# Patient Record
Sex: FEMALE | Race: WHITE | NOT HISPANIC OR LATINO | Employment: FULL TIME | URBAN - METROPOLITAN AREA
[De-identification: names, ages, dates, MRNs, and addresses within clinical notes are randomized per-mention and may not be internally consistent; named-entity substitution may affect disease eponyms.]

---

## 2023-04-08 DIAGNOSIS — I10 ESSENTIAL (PRIMARY) HYPERTENSION: ICD-10-CM

## 2023-04-10 RX ORDER — LABETALOL 200 MG/1
TABLET, FILM COATED ORAL
Qty: 180 TABLET | Refills: 3 | Status: SHIPPED | OUTPATIENT
Start: 2023-04-10 | End: 2024-04-17

## 2023-06-16 ENCOUNTER — APPOINTMENT (OUTPATIENT)
Dept: PRIMARY CARE | Facility: CLINIC | Age: 48
End: 2023-06-16
Payer: COMMERCIAL

## 2023-08-05 DIAGNOSIS — E78.5 HYPERLIPIDEMIA, UNSPECIFIED: ICD-10-CM

## 2023-08-07 RX ORDER — ROSUVASTATIN CALCIUM 10 MG/1
10 TABLET, COATED ORAL DAILY
Qty: 90 TABLET | Refills: 1 | Status: SHIPPED | OUTPATIENT
Start: 2023-08-07 | End: 2024-02-13

## 2023-09-25 ENCOUNTER — OFFICE VISIT (OUTPATIENT)
Dept: PRIMARY CARE | Facility: CLINIC | Age: 48
End: 2023-09-25
Payer: COMMERCIAL

## 2023-09-25 VITALS
OXYGEN SATURATION: 96 % | SYSTOLIC BLOOD PRESSURE: 134 MMHG | WEIGHT: 242.8 LBS | DIASTOLIC BLOOD PRESSURE: 88 MMHG | HEART RATE: 81 BPM | TEMPERATURE: 98.3 F | BODY MASS INDEX: 43.02 KG/M2 | HEIGHT: 63 IN

## 2023-09-25 DIAGNOSIS — Z00.00 HEALTHCARE MAINTENANCE: ICD-10-CM

## 2023-09-25 DIAGNOSIS — E66.01 CLASS 3 SEVERE OBESITY WITHOUT SERIOUS COMORBIDITY WITH BODY MASS INDEX (BMI) OF 40.0 TO 44.9 IN ADULT, UNSPECIFIED OBESITY TYPE (MULTI): ICD-10-CM

## 2023-09-25 DIAGNOSIS — H60.90 OTITIS EXTERNA, UNSPECIFIED CHRONICITY, UNSPECIFIED LATERALITY, UNSPECIFIED TYPE: ICD-10-CM

## 2023-09-25 DIAGNOSIS — E78.5 DYSLIPIDEMIA, GOAL LDL BELOW 70: ICD-10-CM

## 2023-09-25 DIAGNOSIS — I25.10 CORONARY ARTERY CALCIFICATION: ICD-10-CM

## 2023-09-25 DIAGNOSIS — I10 BENIGN ESSENTIAL HYPERTENSION: Primary | ICD-10-CM

## 2023-09-25 DIAGNOSIS — G43.909 MIGRAINE WITHOUT STATUS MIGRAINOSUS, NOT INTRACTABLE, UNSPECIFIED MIGRAINE TYPE: ICD-10-CM

## 2023-09-25 DIAGNOSIS — F17.209 NICOTINE-RELATED DISORDER: ICD-10-CM

## 2023-09-25 DIAGNOSIS — I25.84 CORONARY ARTERY CALCIFICATION: ICD-10-CM

## 2023-09-25 DIAGNOSIS — E04.2 MULTIPLE THYROID NODULES: ICD-10-CM

## 2023-09-25 DIAGNOSIS — Z00.00 ANNUAL PHYSICAL EXAM: ICD-10-CM

## 2023-09-25 PROBLEM — U07.1 COVID-19 VIRUS INFECTION: Status: RESOLVED | Noted: 2023-09-25 | Resolved: 2023-09-25

## 2023-09-25 PROBLEM — D31.41 NEVUS OF IRIS OF RIGHT EYE: Status: ACTIVE | Noted: 2023-09-25

## 2023-09-25 PROBLEM — R03.0 ELEVATED BLOOD PRESSURE READING: Status: RESOLVED | Noted: 2023-09-25 | Resolved: 2023-09-25

## 2023-09-25 PROBLEM — R06.02 SHORTNESS OF BREATH: Status: RESOLVED | Noted: 2023-09-25 | Resolved: 2023-09-25

## 2023-09-25 PROBLEM — R91.1 NODULE OF LOWER LOBE OF LEFT LUNG: Status: ACTIVE | Noted: 2023-09-25

## 2023-09-25 PROBLEM — R42 VERTIGO: Status: ACTIVE | Noted: 2023-09-25

## 2023-09-25 PROBLEM — U07.1 COVID-19 VIRUS INFECTION: Status: ACTIVE | Noted: 2023-09-25

## 2023-09-25 PROBLEM — H52.13 MYOPIA OF BOTH EYES: Status: ACTIVE | Noted: 2023-09-25

## 2023-09-25 PROBLEM — R03.0 ELEVATED BLOOD PRESSURE READING: Status: ACTIVE | Noted: 2023-09-25

## 2023-09-25 PROBLEM — H52.03 HYPERMETROPIA OF BOTH EYES: Status: ACTIVE | Noted: 2023-09-25

## 2023-09-25 PROBLEM — R06.02 SHORTNESS OF BREATH: Status: ACTIVE | Noted: 2023-09-25

## 2023-09-25 PROBLEM — S39.012A LUMBAR STRAIN, INITIAL ENCOUNTER: Status: ACTIVE | Noted: 2023-09-25

## 2023-09-25 PROBLEM — R32 URINARY INCONTINENCE: Status: ACTIVE | Noted: 2023-09-25

## 2023-09-25 PROBLEM — R19.7 DIARRHEA: Status: ACTIVE | Noted: 2023-09-25

## 2023-09-25 PROBLEM — R32 URINARY INCONTINENCE: Status: RESOLVED | Noted: 2023-09-25 | Resolved: 2023-09-25

## 2023-09-25 PROBLEM — R00.0 TACHYCARDIA WITH HYPERTENSION: Status: RESOLVED | Noted: 2023-09-25 | Resolved: 2023-09-25

## 2023-09-25 PROBLEM — N92.6 IRREGULAR MENSTRUAL CYCLE: Status: ACTIVE | Noted: 2023-09-25

## 2023-09-25 PROBLEM — R00.0 TACHYCARDIA WITH HYPERTENSION: Status: ACTIVE | Noted: 2023-09-25

## 2023-09-25 PROBLEM — R94.31 ABNORMAL EKG: Status: ACTIVE | Noted: 2023-09-25

## 2023-09-25 PROBLEM — E04.1 THYROID NODULE: Status: ACTIVE | Noted: 2023-09-25

## 2023-09-25 PROCEDURE — 3008F BODY MASS INDEX DOCD: CPT | Performed by: INTERNAL MEDICINE

## 2023-09-25 PROCEDURE — 90686 IIV4 VACC NO PRSV 0.5 ML IM: CPT | Performed by: INTERNAL MEDICINE

## 2023-09-25 PROCEDURE — 90471 IMMUNIZATION ADMIN: CPT | Performed by: INTERNAL MEDICINE

## 2023-09-25 PROCEDURE — 3079F DIAST BP 80-89 MM HG: CPT | Performed by: INTERNAL MEDICINE

## 2023-09-25 PROCEDURE — 3075F SYST BP GE 130 - 139MM HG: CPT | Performed by: INTERNAL MEDICINE

## 2023-09-25 PROCEDURE — 99214 OFFICE O/P EST MOD 30 MIN: CPT | Performed by: INTERNAL MEDICINE

## 2023-09-25 RX ORDER — SUMATRIPTAN SUCCINATE 100 MG/1
100 TABLET ORAL ONCE AS NEEDED
COMMUNITY
Start: 2018-12-04

## 2023-09-25 RX ORDER — PROCHLORPERAZINE MALEATE 10 MG
10 TABLET ORAL
COMMUNITY
Start: 2020-07-14

## 2023-09-25 RX ORDER — AMLODIPINE BESYLATE 10 MG/1
10 TABLET ORAL DAILY
COMMUNITY
End: 2023-11-20

## 2023-09-25 RX ORDER — CIPROFLOXACIN AND DEXAMETHASONE 3; 1 MG/ML; MG/ML
4 SUSPENSION/ DROPS AURICULAR (OTIC) 2 TIMES DAILY
Qty: 7.5 ML | Refills: 0 | Status: SHIPPED | OUTPATIENT
Start: 2023-09-25 | End: 2023-10-02

## 2023-09-25 RX ORDER — MECLIZINE HYDROCHLORIDE 25 MG/1
25 TABLET ORAL 3 TIMES DAILY PRN
COMMUNITY
Start: 2020-07-14

## 2023-09-25 ASSESSMENT — PATIENT HEALTH QUESTIONNAIRE - PHQ9
2. FEELING DOWN, DEPRESSED OR HOPELESS: NOT AT ALL
SUM OF ALL RESPONSES TO PHQ9 QUESTIONS 1 AND 2: 0
1. LITTLE INTEREST OR PLEASURE IN DOING THINGS: NOT AT ALL

## 2023-09-25 ASSESSMENT — ENCOUNTER SYMPTOMS
RESPIRATORY NEGATIVE: 1
CONSTITUTIONAL NEGATIVE: 1
NEUROLOGICAL NEGATIVE: 1
GASTROINTESTINAL NEGATIVE: 1
CARDIOVASCULAR NEGATIVE: 1
EYES NEGATIVE: 1
HEMATOLOGIC/LYMPHATIC NEGATIVE: 1
PSYCHIATRIC NEGATIVE: 1

## 2023-09-25 NOTE — ASSESSMENT & PLAN NOTE
I spent more than 3 minutes counseling patient about need for smoking/tobacco  cessation and how I can support efforts when patient is ready to quit,discussed Nicotine replacement therapy,bupropion,hypnosis,support groups,and acupuncture as potential options.  patient currently has no signs or symptoms of tobacco related disease.  Flu vaccine today.  I recommend Covid booster.  Return in February for wellness.  Go for labs prior to next appt.

## 2023-09-25 NOTE — PROGRESS NOTES
"Subjective   Patient ID: Oni Washburn is a 47 y.o. female who presents for 4 month follow up .     Here to f/u on HTN,BPV,thyroid nodule(had thyroid Bx Dr Nino escamilla)(had 3 attacks over last year,took Dramamine),LMP 12/2022,has always been irregular,no hot flashes.  She spoke to ENT about her vertigo.  She had mammogram.  She still has the cologuard kit,not sent yet.  she has stable migraine headache on Sumatriptan and Aleve PRN (was on Treximet in past),told to monitor BP while on Aleve.  she smokes 7 cigarettes per day.  she has h/o BPV,had 3 attacks over last year.            Review of Systems   Constitutional: Negative.    HENT: Negative.     Eyes: Negative.    Respiratory: Negative.     Cardiovascular: Negative.    Gastrointestinal: Negative.    Genitourinary: Negative.    Neurological: Negative.    Hematological: Negative.    Psychiatric/Behavioral: Negative.         Objective   /88 (BP Location: Left arm, Patient Position: Sitting)   Pulse 81   Temp 36.8 °C (98.3 °F) (Temporal)   Ht 1.6 m (5' 3\")   Wt 110 kg (242 lb 12.8 oz)   SpO2 96%   BMI 43.01 kg/m²     Physical Exam  Constitutional:       Appearance: Normal appearance.   HENT:      Head: Normocephalic and atraumatic.   Eyes:      Extraocular Movements: Extraocular movements intact.      Pupils: Pupils are equal, round, and reactive to light.   Cardiovascular:      Rate and Rhythm: Normal rate and regular rhythm.      Heart sounds: Normal heart sounds.   Pulmonary:      Effort: Pulmonary effort is normal.      Breath sounds: Normal breath sounds. No wheezing or rhonchi.   Abdominal:      General: Abdomen is flat. Bowel sounds are normal. There is no distension.      Palpations: Abdomen is soft.   Musculoskeletal:         General: Normal range of motion.      Cervical back: Normal range of motion and neck supple.      Right lower leg: No edema.      Left lower leg: No edema.   Skin:     General: Skin is warm.   Neurological:      General: No " focal deficit present.      Mental Status: She is alert and oriented to person, place, and time.   Psychiatric:         Mood and Affect: Mood normal.         Behavior: Behavior normal.         Assessment/Plan   Problem List Items Addressed This Visit             ICD-10-CM    Benign essential hypertension - Primary I10     Stable on current meds.         Coronary artery calcification I25.10, I25.84    Relevant Medications    amLODIPine (Norvasc) 10 mg tablet    Dyslipidemia, goal LDL below 70 E78.5     Stable on statin.         Migraines G43.909     Stable.         Multiple thyroid nodules E04.2    Nicotine-related disorder F17.209     I spent more than 3 minutes counseling patient about need for smoking/tobacco  cessation and how I can support efforts when patient is ready to quit,discussed Nicotine replacement therapy,bupropion,hypnosis,support groups,and acupuncture as potential options.  patient currently has no signs or symptoms of tobacco related disease.  Flu vaccine today.  I recommend Covid booster.  Return in February for wellness.  Go for labs prior to next appt.         Obesity E66.9     Pt lost 50 pounds and kept it off.          Other Visit Diagnoses         Codes    Healthcare maintenance     Z00.00    Relevant Orders    Flu vaccine (IIV4) age 6 months and greater, preservative free (Completed)    Otitis externa, unspecified chronicity, unspecified laterality, unspecified type     H60.90    Relevant Medications    ciprofloxacin-dexamethasone (CiproDEX) otic suspension    Annual physical exam     Z00.00    Relevant Orders    CBC and Auto Differential    Comprehensive Metabolic Panel    Hemoglobin A1C    Lipid Panel    TSH with reflex to Free T4 if abnormal

## 2024-02-13 DIAGNOSIS — E78.5 HYPERLIPIDEMIA, UNSPECIFIED: ICD-10-CM

## 2024-02-13 RX ORDER — ROSUVASTATIN CALCIUM 10 MG/1
10 TABLET, COATED ORAL DAILY
Qty: 90 TABLET | Refills: 1 | Status: SHIPPED | OUTPATIENT
Start: 2024-02-13

## 2024-02-19 ENCOUNTER — PATIENT MESSAGE (OUTPATIENT)
Dept: PRIMARY CARE | Facility: CLINIC | Age: 49
End: 2024-02-19
Payer: COMMERCIAL

## 2024-02-19 DIAGNOSIS — J22 ACUTE RESPIRATORY INFECTION: Primary | ICD-10-CM

## 2024-02-19 RX ORDER — DOXYCYCLINE 100 MG/1
100 CAPSULE ORAL 2 TIMES DAILY
Qty: 14 CAPSULE | Refills: 0 | Status: SHIPPED | OUTPATIENT
Start: 2024-02-19 | End: 2024-02-26

## 2024-02-23 ENCOUNTER — LAB (OUTPATIENT)
Dept: LAB | Facility: LAB | Age: 49
End: 2024-02-23
Payer: COMMERCIAL

## 2024-02-23 ENCOUNTER — OFFICE VISIT (OUTPATIENT)
Dept: PRIMARY CARE | Facility: CLINIC | Age: 49
End: 2024-02-23
Payer: COMMERCIAL

## 2024-02-23 VITALS
HEART RATE: 81 BPM | HEIGHT: 63 IN | TEMPERATURE: 97.5 F | SYSTOLIC BLOOD PRESSURE: 125 MMHG | WEIGHT: 246.4 LBS | OXYGEN SATURATION: 98 % | DIASTOLIC BLOOD PRESSURE: 88 MMHG | BODY MASS INDEX: 43.66 KG/M2

## 2024-02-23 DIAGNOSIS — R05.1 ACUTE COUGH: ICD-10-CM

## 2024-02-23 DIAGNOSIS — R91.1 NODULE OF LOWER LOBE OF LEFT LUNG: ICD-10-CM

## 2024-02-23 DIAGNOSIS — I25.10 CORONARY ARTERY CALCIFICATION: ICD-10-CM

## 2024-02-23 DIAGNOSIS — G43.909 MIGRAINE WITHOUT STATUS MIGRAINOSUS, NOT INTRACTABLE, UNSPECIFIED MIGRAINE TYPE: ICD-10-CM

## 2024-02-23 DIAGNOSIS — E04.1 THYROID NODULE: ICD-10-CM

## 2024-02-23 DIAGNOSIS — J20.8 ACUTE BRONCHITIS DUE TO OTHER SPECIFIED ORGANISMS: ICD-10-CM

## 2024-02-23 DIAGNOSIS — E78.5 DYSLIPIDEMIA, GOAL LDL BELOW 70: ICD-10-CM

## 2024-02-23 DIAGNOSIS — I25.84 CORONARY ARTERY CALCIFICATION: ICD-10-CM

## 2024-02-23 DIAGNOSIS — I10 BENIGN ESSENTIAL HYPERTENSION: ICD-10-CM

## 2024-02-23 DIAGNOSIS — Z00.00 ANNUAL PHYSICAL EXAM: ICD-10-CM

## 2024-02-23 DIAGNOSIS — E04.2 MULTIPLE THYROID NODULES: ICD-10-CM

## 2024-02-23 DIAGNOSIS — Z12.31 VISIT FOR SCREENING MAMMOGRAM: ICD-10-CM

## 2024-02-23 DIAGNOSIS — Z00.00 ANNUAL PHYSICAL EXAM: Primary | ICD-10-CM

## 2024-02-23 PROBLEM — N92.6 IRREGULAR MENSTRUAL CYCLE: Status: RESOLVED | Noted: 2023-09-25 | Resolved: 2024-02-23

## 2024-02-23 PROBLEM — R19.7 DIARRHEA: Status: RESOLVED | Noted: 2023-09-25 | Resolved: 2024-02-23

## 2024-02-23 PROBLEM — R11.2 NAUSEA AND VOMITING: Status: ACTIVE | Noted: 2024-02-23

## 2024-02-23 PROBLEM — H60.90 OTITIS EXTERNA: Status: ACTIVE | Noted: 2024-02-23

## 2024-02-23 LAB
ALBUMIN SERPL BCP-MCNC: 4.1 G/DL (ref 3.4–5)
ALP SERPL-CCNC: 47 U/L (ref 33–110)
ALT SERPL W P-5'-P-CCNC: 25 U/L (ref 7–45)
ANION GAP SERPL CALC-SCNC: 12 MMOL/L (ref 10–20)
AST SERPL W P-5'-P-CCNC: 19 U/L (ref 9–39)
BILIRUB SERPL-MCNC: 0.5 MG/DL (ref 0–1.2)
BUN SERPL-MCNC: 13 MG/DL (ref 6–23)
CALCIUM SERPL-MCNC: 9 MG/DL (ref 8.6–10.3)
CHLORIDE SERPL-SCNC: 107 MMOL/L (ref 98–107)
CHOLEST SERPL-MCNC: 114 MG/DL (ref 0–199)
CHOLESTEROL/HDL RATIO: 4.8
CO2 SERPL-SCNC: 24 MMOL/L (ref 21–32)
CREAT SERPL-MCNC: 0.86 MG/DL (ref 0.5–1.05)
EGFRCR SERPLBLD CKD-EPI 2021: 84 ML/MIN/1.73M*2
ERYTHROCYTE [DISTWIDTH] IN BLOOD BY AUTOMATED COUNT: 12.8 % (ref 11.5–14.5)
EST. AVERAGE GLUCOSE BLD GHB EST-MCNC: 114 MG/DL
GLUCOSE SERPL-MCNC: 95 MG/DL (ref 74–99)
HBA1C MFR BLD: 5.6 %
HCT VFR BLD AUTO: 44.5 % (ref 36–46)
HDLC SERPL-MCNC: 24 MG/DL
HGB BLD-MCNC: 15.2 G/DL (ref 12–16)
LDLC SERPL CALC-MCNC: 70 MG/DL
MCH RBC QN AUTO: 30.7 PG (ref 26–34)
MCHC RBC AUTO-ENTMCNC: 34.2 G/DL (ref 32–36)
MCV RBC AUTO: 90 FL (ref 80–100)
NON HDL CHOLESTEROL: 90 MG/DL (ref 0–149)
NRBC BLD-RTO: 0 /100 WBCS (ref 0–0)
PLATELET # BLD AUTO: 266 X10*3/UL (ref 150–450)
POTASSIUM SERPL-SCNC: 4.2 MMOL/L (ref 3.5–5.3)
PROT SERPL-MCNC: 6.8 G/DL (ref 6.4–8.2)
RBC # BLD AUTO: 4.95 X10*6/UL (ref 4–5.2)
SODIUM SERPL-SCNC: 139 MMOL/L (ref 136–145)
TRIGL SERPL-MCNC: 101 MG/DL (ref 0–149)
TSH SERPL-ACNC: 1.08 MIU/L (ref 0.44–3.98)
VLDL: 20 MG/DL (ref 0–40)
WBC # BLD AUTO: 5.6 X10*3/UL (ref 4.4–11.3)

## 2024-02-23 PROCEDURE — 80053 COMPREHEN METABOLIC PANEL: CPT

## 2024-02-23 PROCEDURE — 99396 PREV VISIT EST AGE 40-64: CPT | Performed by: INTERNAL MEDICINE

## 2024-02-23 PROCEDURE — 85027 COMPLETE CBC AUTOMATED: CPT

## 2024-02-23 PROCEDURE — 83036 HEMOGLOBIN GLYCOSYLATED A1C: CPT

## 2024-02-23 PROCEDURE — 3074F SYST BP LT 130 MM HG: CPT | Performed by: INTERNAL MEDICINE

## 2024-02-23 PROCEDURE — 84443 ASSAY THYROID STIM HORMONE: CPT

## 2024-02-23 PROCEDURE — 36415 COLL VENOUS BLD VENIPUNCTURE: CPT

## 2024-02-23 PROCEDURE — 3079F DIAST BP 80-89 MM HG: CPT | Performed by: INTERNAL MEDICINE

## 2024-02-23 PROCEDURE — 80061 LIPID PANEL: CPT

## 2024-02-23 PROCEDURE — 3008F BODY MASS INDEX DOCD: CPT | Performed by: INTERNAL MEDICINE

## 2024-02-23 RX ORDER — ALBUTEROL SULFATE 90 UG/1
AEROSOL, METERED RESPIRATORY (INHALATION)
Qty: 8.5 G | Refills: 0 | Status: SHIPPED | OUTPATIENT
Start: 2024-02-23

## 2024-02-23 ASSESSMENT — ENCOUNTER SYMPTOMS
EYES NEGATIVE: 1
CONSTITUTIONAL NEGATIVE: 1
NEUROLOGICAL NEGATIVE: 1
HEMATOLOGIC/LYMPHATIC NEGATIVE: 1
CARDIOVASCULAR NEGATIVE: 1
COUGH: 1
GASTROINTESTINAL NEGATIVE: 1
PSYCHIATRIC NEGATIVE: 1

## 2024-02-23 ASSESSMENT — PATIENT HEALTH QUESTIONNAIRE - PHQ9
1. LITTLE INTEREST OR PLEASURE IN DOING THINGS: NOT AT ALL
2. FEELING DOWN, DEPRESSED OR HOPELESS: NOT AT ALL
SUM OF ALL RESPONSES TO PHQ9 QUESTIONS 1 AND 2: 0

## 2024-02-23 NOTE — ASSESSMENT & PLAN NOTE
Complete physical examination completed today.  Advised to keep a heart healthy, low-fat diet as recommended diet is the Mediterranean diet.  Advised to exercise regularly for 30 minutes daily 5 days a week and maintain 150 minutes of exercise per week.  Discussed age-appropriate cancer screening,Immunization and recommendation were given.  Advised on regular eye and dental visit.  Advised on staying well-hydrated.

## 2024-02-23 NOTE — PROGRESS NOTES
"Subjective   Patient ID: Oni Washburn is a 48 y.o. female who presents for Annual Exam.     Here for CPE and to f/u on HTN,BPV,thyroid nodule(had thyroid Bx Dr Oneill ),LMP 12/2022,hot flashes  resolved .  She is here also to follow-up on her respiratory infection, feeling better with doxycycline still has some residual cough, her COVID test was negative but was checked on day 1 ask her to recheck it again today.  She had mammogram April 2023.  Last cologuard was negative on 11/22/2023  she has stable migraine headache on Sumatriptan and Aleve PRN (was on Treximet in past),told to monitor BP while on Aleve.  she smokes 7 cigarettes or less per day , not smoking due to current respiratory infection.  she has h/o BPV,had few attacks over last year.            Review of Systems   Constitutional: Negative.    HENT: Negative.     Eyes: Negative.    Respiratory:  Positive for cough.         As HPI has a respiratory infection for 5 days.   Cardiovascular: Negative.    Gastrointestinal: Negative.    Genitourinary: Negative.    Neurological: Negative.    Hematological: Negative.    Psychiatric/Behavioral: Negative.         Objective   /88 (BP Location: Left arm, Patient Position: Sitting, BP Cuff Size: Large adult)   Pulse 81   Temp 36.4 °C (97.5 °F) (Temporal)   Ht 1.6 m (5' 3\")   Wt 112 kg (246 lb 6.4 oz)   SpO2 98%   BMI 43.65 kg/m²     Physical Exam  Vitals reviewed.   Constitutional:       General: She is not in acute distress.     Appearance: Normal appearance.   HENT:      Head: Normocephalic and atraumatic.      Right Ear: Tympanic membrane and ear canal normal. There is no impacted cerumen.      Left Ear: Tympanic membrane and ear canal normal. There is no impacted cerumen.      Nose: Nose normal.      Mouth/Throat:      Mouth: Mucous membranes are moist.      Pharynx: No oropharyngeal exudate or posterior oropharyngeal erythema.   Eyes:      General: No scleral icterus.     Extraocular Movements: " Extraocular movements intact.      Pupils: Pupils are equal, round, and reactive to light.   Neck:      Vascular: No carotid bruit.   Cardiovascular:      Rate and Rhythm: Normal rate and regular rhythm.      Pulses: Normal pulses.      Heart sounds: Normal heart sounds. No murmur heard.  Pulmonary:      Effort: Pulmonary effort is normal.      Comments: Coarse breath sounds clearing up with coughing.  Chest:   Breasts:     Right: Normal. No mass, skin change or tenderness.      Left: Normal. No mass, skin change or tenderness.   Abdominal:      General: Abdomen is flat. Bowel sounds are normal.      Palpations: Abdomen is soft.   Musculoskeletal:         General: Normal range of motion.      Cervical back: Normal range of motion and neck supple.   Lymphadenopathy:      Cervical: No cervical adenopathy.      Upper Body:      Right upper body: No supraclavicular or axillary adenopathy.      Left upper body: No supraclavicular or axillary adenopathy.   Neurological:      General: No focal deficit present.      Mental Status: She is alert and oriented to person, place, and time.   Psychiatric:         Mood and Affect: Mood normal.         Assessment/Plan   Problem List Items Addressed This Visit             ICD-10-CM    Benign essential hypertension I10     Stable on current medication.         Coronary artery calcification I25.10, I25.84     Denies any chest pain, seen by cardiologist for evaluation past.         Dyslipidemia, goal LDL below 70 E78.5     Stable on statin and low-fat diet.  Stop Smoking.         Migraines G43.909     Stable on current medication.         Multiple thyroid nodules E04.2     Follow-up with ENT.         Nodule of lower lobe of left lung R91.1     Will defer CT due to her current acute bronchitis, will plan on seeing patient in 4 months and at that time I will order her CT chest.  Advised to stop smoking.         Thyroid nodule E04.1    Annual physical exam - Primary Z00.00     Complete  physical examination completed today.  Advised to keep a heart healthy, low-fat diet as recommended diet is the Mediterranean diet.  Advised to exercise regularly for 30 minutes daily 5 days a week and maintain 150 minutes of exercise per week.  Discussed age-appropriate cancer screening,Immunization and recommendation were given.  Advised on regular eye and dental visit.  Advised on staying well-hydrated.         Relevant Orders    CBC    Comprehensive Metabolic Panel    Lipid Panel    TSH with reflex to Free T4 if abnormal    Hemoglobin A1C    Acute bronchitis due to other specified organisms J20.8     May finish off current doxycycline.  Stay off smoking.  Can add Mucinex DM.  Albuterol inhaler to be used as needed.  Repeat another COVID test today call if positive.  Will be due for Cologuard November 2026.  Order mammogram for April 2024.         Relevant Medications    albuterol (ProAir HFA) 90 mcg/actuation inhaler    Acute cough R05.1    Relevant Medications    albuterol (ProAir HFA) 90 mcg/actuation inhaler     Other Visit Diagnoses         Codes    Visit for screening mammogram     Z12.31    Relevant Orders    BI mammo bilateral screening tomosynthesis

## 2024-02-23 NOTE — ASSESSMENT & PLAN NOTE
Will defer CT due to her current acute bronchitis, will plan on seeing patient in 4 months and at that time I will order her CT chest.  Advised to stop smoking.

## 2024-02-23 NOTE — ASSESSMENT & PLAN NOTE
May finish off current doxycycline.  Stay off smoking.  Can add Mucinex DM.  Albuterol inhaler to be used as needed.  Repeat another COVID test today call if positive.  Will be due for Cologuard November 2026.  Order mammogram for April 2024.

## 2024-04-17 DIAGNOSIS — I10 ESSENTIAL (PRIMARY) HYPERTENSION: ICD-10-CM

## 2024-04-17 RX ORDER — LABETALOL 200 MG/1
1 TABLET, FILM COATED ORAL 2 TIMES DAILY
Qty: 180 TABLET | Refills: 0 | Status: SHIPPED | OUTPATIENT
Start: 2024-04-17

## 2024-05-03 ENCOUNTER — HOSPITAL ENCOUNTER (OUTPATIENT)
Dept: RADIOLOGY | Facility: CLINIC | Age: 49
Discharge: HOME | End: 2024-05-03
Payer: COMMERCIAL

## 2024-05-03 VITALS — BODY MASS INDEX: 43.75 KG/M2 | HEIGHT: 63 IN | WEIGHT: 246.91 LBS

## 2024-05-03 DIAGNOSIS — Z12.31 VISIT FOR SCREENING MAMMOGRAM: ICD-10-CM

## 2024-05-03 PROCEDURE — 77067 SCR MAMMO BI INCL CAD: CPT

## 2024-05-03 PROCEDURE — 77067 SCR MAMMO BI INCL CAD: CPT | Performed by: STUDENT IN AN ORGANIZED HEALTH CARE EDUCATION/TRAINING PROGRAM

## 2024-05-03 PROCEDURE — 77063 BREAST TOMOSYNTHESIS BI: CPT | Performed by: STUDENT IN AN ORGANIZED HEALTH CARE EDUCATION/TRAINING PROGRAM

## 2024-06-14 ENCOUNTER — APPOINTMENT (OUTPATIENT)
Dept: PRIMARY CARE | Facility: CLINIC | Age: 49
End: 2024-06-14
Payer: COMMERCIAL

## 2024-07-24 DIAGNOSIS — I10 ESSENTIAL (PRIMARY) HYPERTENSION: ICD-10-CM

## 2024-07-24 RX ORDER — LABETALOL 200 MG/1
1 TABLET, FILM COATED ORAL 2 TIMES DAILY
Qty: 180 TABLET | Refills: 0 | Status: SHIPPED | OUTPATIENT
Start: 2024-07-24

## 2024-08-13 DIAGNOSIS — E78.5 HYPERLIPIDEMIA, UNSPECIFIED: ICD-10-CM

## 2024-08-13 RX ORDER — ROSUVASTATIN CALCIUM 10 MG/1
10 TABLET, COATED ORAL DAILY
Qty: 90 TABLET | Refills: 1 | Status: SHIPPED | OUTPATIENT
Start: 2024-08-13

## 2024-09-04 ENCOUNTER — APPOINTMENT (OUTPATIENT)
Dept: PRIMARY CARE | Facility: CLINIC | Age: 49
End: 2024-09-04
Payer: COMMERCIAL

## 2024-09-04 VITALS
WEIGHT: 237 LBS | HEART RATE: 74 BPM | TEMPERATURE: 97.9 F | BODY MASS INDEX: 41.99 KG/M2 | SYSTOLIC BLOOD PRESSURE: 138 MMHG | DIASTOLIC BLOOD PRESSURE: 84 MMHG | OXYGEN SATURATION: 97 %

## 2024-09-04 DIAGNOSIS — I25.84 CORONARY ARTERY CALCIFICATION: ICD-10-CM

## 2024-09-04 DIAGNOSIS — F17.209 NICOTINE-RELATED DISORDER: ICD-10-CM

## 2024-09-04 DIAGNOSIS — R11.2 NAUSEA AND VOMITING, UNSPECIFIED VOMITING TYPE: ICD-10-CM

## 2024-09-04 DIAGNOSIS — I25.10 CORONARY ARTERY CALCIFICATION: ICD-10-CM

## 2024-09-04 DIAGNOSIS — E04.2 MULTIPLE THYROID NODULES: ICD-10-CM

## 2024-09-04 DIAGNOSIS — R42 VERTIGO: ICD-10-CM

## 2024-09-04 DIAGNOSIS — E66.01 CLASS 3 SEVERE OBESITY WITHOUT SERIOUS COMORBIDITY WITH BODY MASS INDEX (BMI) OF 40.0 TO 44.9 IN ADULT, UNSPECIFIED OBESITY TYPE (MULTI): ICD-10-CM

## 2024-09-04 DIAGNOSIS — Z23 NEED FOR INFLUENZA VACCINATION: ICD-10-CM

## 2024-09-04 DIAGNOSIS — I10 BENIGN ESSENTIAL HYPERTENSION: Primary | ICD-10-CM

## 2024-09-04 DIAGNOSIS — N95.0 POST-MENOPAUSAL BLEEDING: ICD-10-CM

## 2024-09-04 PROCEDURE — 90471 IMMUNIZATION ADMIN: CPT | Performed by: INTERNAL MEDICINE

## 2024-09-04 PROCEDURE — 90656 IIV3 VACC NO PRSV 0.5 ML IM: CPT | Performed by: INTERNAL MEDICINE

## 2024-09-04 PROCEDURE — 3079F DIAST BP 80-89 MM HG: CPT | Performed by: INTERNAL MEDICINE

## 2024-09-04 PROCEDURE — 99214 OFFICE O/P EST MOD 30 MIN: CPT | Performed by: INTERNAL MEDICINE

## 2024-09-04 PROCEDURE — 3075F SYST BP GE 130 - 139MM HG: CPT | Performed by: INTERNAL MEDICINE

## 2024-09-04 RX ORDER — PROCHLORPERAZINE MALEATE 10 MG
TABLET ORAL
Qty: 30 TABLET | Refills: 3 | Status: SHIPPED | OUTPATIENT
Start: 2024-09-04

## 2024-09-04 RX ORDER — MECLIZINE HYDROCHLORIDE 25 MG/1
25 TABLET ORAL 3 TIMES DAILY PRN
Qty: 30 TABLET | Refills: 3 | Status: SHIPPED | OUTPATIENT
Start: 2024-09-04

## 2024-09-05 PROBLEM — Z23 NEED FOR INFLUENZA VACCINATION: Status: ACTIVE | Noted: 2024-09-05

## 2024-09-05 ASSESSMENT — ENCOUNTER SYMPTOMS
HEMATOLOGIC/LYMPHATIC NEGATIVE: 1
CARDIOVASCULAR NEGATIVE: 1
PSYCHIATRIC NEGATIVE: 1
CONSTITUTIONAL NEGATIVE: 1
EYES NEGATIVE: 1
NEUROLOGICAL NEGATIVE: 1
RESPIRATORY NEGATIVE: 1
GASTROINTESTINAL NEGATIVE: 1

## 2024-09-05 NOTE — PROGRESS NOTES
"Subjective   Patient ID: Oni Washburn is a 48 y.o. female who presents for Follow-up (Patient is here for a 4 month follow up. Patient says that she started her menstrual cycle started for the first time in 2 years after menopause was confirmed through blood work. ).    Pt is here to f/u on HTN,BPV,thyroid nodule(had thyroid Bx Dr Oneill ),LMP 12/2022,hot flashes  resolved .  She has not had a period for over 1-1/2-year but 2 weeks ago she had\" a full-blown period\", denies abdominal pain or cramping.  She had mammogram 5/3/2024  Last cologuard was negative on 11/22/2023  she has stable migraine headache on Sumatriptan and Aleve PRN (was on Treximet in past),told to monitor BP while on Aleve.  she smokes 7 cigarettes or less per day, she has been under stress  she has h/o BPV, lately feeling nauseated, needs refill on her medication has not had dizziness for a long time.  Denies any acid reflux or abdominal pain.         Review of Systems   Constitutional: Negative.    HENT: Negative.     Eyes: Negative.    Respiratory: Negative.     Cardiovascular: Negative.    Gastrointestinal: Negative.    Genitourinary: Negative.    Neurological: Negative.    Hematological: Negative.    Psychiatric/Behavioral: Negative.         Objective   /84 (BP Location: Left arm, Patient Position: Sitting)   Pulse 74   Temp 36.6 °C (97.9 °F) (Temporal)   Wt 108 kg (237 lb)   SpO2 97%   BMI 41.99 kg/m²     Physical Exam  Constitutional:       Appearance: Normal appearance.   HENT:      Head: Normocephalic and atraumatic.   Eyes:      Extraocular Movements: Extraocular movements intact.      Pupils: Pupils are equal, round, and reactive to light.   Cardiovascular:      Rate and Rhythm: Normal rate and regular rhythm.      Heart sounds: Normal heart sounds.   Pulmonary:      Effort: Pulmonary effort is normal.      Breath sounds: Normal breath sounds. No wheezing or rhonchi.   Abdominal:      General: Abdomen is flat. Bowel sounds are " normal. There is no distension.      Palpations: Abdomen is soft.      Tenderness: There is no right CVA tenderness or left CVA tenderness.   Musculoskeletal:         General: Normal range of motion.      Cervical back: Normal range of motion and neck supple.      Right lower leg: No edema.      Left lower leg: No edema.   Skin:     General: Skin is warm.   Neurological:      General: No focal deficit present.      Mental Status: She is alert and oriented to person, place, and time.   Psychiatric:         Mood and Affect: Mood normal.         Behavior: Behavior normal.         Assessment/Plan   Problem List Items Addressed This Visit             ICD-10-CM    Benign essential hypertension - Primary I10     Stable on current medication.         Coronary artery calcification I25.10, I25.84     Denies any chest pain, seen by cardiologist for evaluation past.         Multiple thyroid nodules E04.2     Follow-up with ENT.  Had biopsy in past.         Vertigo R42     Renew medication.         Relevant Medications    meclizine (Antivert) 25 mg tablet    Nicotine-related disorder F17.209    Obesity E66.9     Watch calorie intake exercise regularly lose weight.         Nausea and vomiting R11.2    Relevant Medications    prochlorperazine (Compazine) 10 mg tablet    Post-menopausal bleeding N95.0     Will order pelvic ultrasound and refer to gynecologist for further evaluation.         Relevant Orders    US PELVIS TRANSABDOMINAL WITH TRANSVAGINAL    Referral to Gynecology    Need for influenza vaccination Z23    Relevant Orders    Flu vaccine, trivalent, preservative free, age 6 months and greater (Fluraix/Fluzone/Flulaval) (Completed)

## 2024-10-07 ENCOUNTER — HOSPITAL ENCOUNTER (OUTPATIENT)
Dept: RADIOLOGY | Facility: CLINIC | Age: 49
Discharge: HOME | End: 2024-10-07
Payer: COMMERCIAL

## 2024-10-07 DIAGNOSIS — N95.0 POST-MENOPAUSAL BLEEDING: ICD-10-CM

## 2024-10-07 PROCEDURE — 76830 TRANSVAGINAL US NON-OB: CPT | Performed by: RADIOLOGY

## 2024-10-07 PROCEDURE — 76856 US EXAM PELVIC COMPLETE: CPT

## 2024-10-07 PROCEDURE — 76856 US EXAM PELVIC COMPLETE: CPT | Performed by: RADIOLOGY

## 2024-10-22 DIAGNOSIS — I10 ESSENTIAL (PRIMARY) HYPERTENSION: ICD-10-CM

## 2024-10-22 RX ORDER — LABETALOL 200 MG/1
1 TABLET, FILM COATED ORAL 2 TIMES DAILY
Qty: 180 TABLET | Refills: 1 | Status: SHIPPED | OUTPATIENT
Start: 2024-10-22

## 2024-11-12 DIAGNOSIS — I10 ESSENTIAL (PRIMARY) HYPERTENSION: ICD-10-CM

## 2024-11-12 RX ORDER — AMLODIPINE BESYLATE 10 MG/1
10 TABLET ORAL DAILY
Qty: 90 TABLET | Refills: 3 | Status: SHIPPED | OUTPATIENT
Start: 2024-11-12

## 2024-12-20 ENCOUNTER — TELEMEDICINE (OUTPATIENT)
Dept: PRIMARY CARE | Facility: CLINIC | Age: 49
End: 2024-12-20
Payer: COMMERCIAL

## 2024-12-20 DIAGNOSIS — J98.8 RESPIRATORY TRACT INFECTION DUE TO COVID-19 VIRUS: Primary | ICD-10-CM

## 2024-12-20 DIAGNOSIS — U07.1 RESPIRATORY TRACT INFECTION DUE TO COVID-19 VIRUS: Primary | ICD-10-CM

## 2024-12-20 DIAGNOSIS — R09.81 NASAL CONGESTION: ICD-10-CM

## 2024-12-20 PROCEDURE — 99213 OFFICE O/P EST LOW 20 MIN: CPT | Performed by: INTERNAL MEDICINE

## 2024-12-20 RX ORDER — FLUTICASONE PROPIONATE 50 MCG
1 SPRAY, SUSPENSION (ML) NASAL DAILY
Qty: 16 G | Refills: 0 | Status: SHIPPED | OUTPATIENT
Start: 2024-12-20 | End: 2025-12-20

## 2024-12-20 ASSESSMENT — PATIENT HEALTH QUESTIONNAIRE - PHQ9
1. LITTLE INTEREST OR PLEASURE IN DOING THINGS: NOT AT ALL
SUM OF ALL RESPONSES TO PHQ9 QUESTIONS 1 AND 2: 0
2. FEELING DOWN, DEPRESSED OR HOPELESS: NOT AT ALL

## 2024-12-20 ASSESSMENT — ENCOUNTER SYMPTOMS
ABDOMINAL PAIN: 0
NECK PAIN: 1
SWOLLEN GLANDS: 0
NAUSEA: 0
WHEEZING: 0
VOMITING: 0
COUGH: 1
SORE THROAT: 0
SINUS PAIN: 0
HEADACHES: 0
RHINORRHEA: 1
DIARRHEA: 0

## 2024-12-20 NOTE — PROGRESS NOTES
Subjective   Patient ID: Oni Washburn is a 49 y.o. female who presents for URI (Patient is here for a virtual visit. Patient tested positive for COVID this morning. Patient complains of fatigue and body aches. ).    An interactive audio and video telecommunication system with patient real time communications between the patient (at the original site) and provider (at the distant site) was utilized to provide this telehealth service.  Verbal consent was requested and obtained from the patient at this date for a telehealth.  Patient started feeling fatigue sluggish last night today she woke up with cough and muscle pain she left for work and went to the pharmacy to get the COVID get and tested just  positive for COVID.  She did not have the recent COVID booster vaccine, she had the flu vaccine for this season.  No loss of smell or taste.    URI   This is a new problem. The current episode started yesterday. The problem has been gradually worsening. There has been no fever. Associated symptoms include congestion, coughing, neck pain and rhinorrhea. Pertinent negatives include no abdominal pain, chest pain, diarrhea, ear pain, headaches, joint pain, joint swelling, nausea, plugged ear sensation, rash, sinus pain, sneezing, sore throat, swollen glands, vomiting or wheezing. Associated symptoms comments: Myalgia,fatigue,sluggish.. Treatments tried: just got Robitussin.        Review of Systems   HENT:  Positive for congestion and rhinorrhea. Negative for ear pain, sinus pain, sneezing and sore throat.    Respiratory:  Positive for cough. Negative for wheezing.    Cardiovascular:  Negative for chest pain.   Gastrointestinal:  Negative for abdominal pain, diarrhea, nausea and vomiting.   Musculoskeletal:  Positive for neck pain. Negative for joint pain.   Skin:  Negative for rash.   Neurological:  Negative for headaches.       Objective   LMP 08/27/2024 (Exact Date)     Physical Exam  Constitutional:       General: She is  not in acute distress.  Pulmonary:      Effort: No respiratory distress.   Neurological:      Mental Status: She is alert.         Assessment/Plan   Problem List Items Addressed This Visit             ICD-10-CM    Respiratory tract infection due to COVID-19 virus - Primary U07.1, J98.8     Patient at high risk for COVID due to her medical problem hypertension , obesity smoking , will treat with Paxlovid , advised to hold off rosuvastatin while taking Paxlovid .  rest,push fluid,monitor pulse ox closely,go to ER if below 90 % or if short of breath.  Follow CDC recommendation regarding guidelines isolation and recommendation.  Call if not better.  Add Flonase nasal spray for nasal congestion as needed.  Can Tylenol for pain and fever and Mucinex DM for cough as needed.         Relevant Medications    nirmatrelvir-ritonavir (Paxlovid) 300 mg (150 mg x 2)-100 mg tablet therapy pack     Other Visit Diagnoses         Codes    Nasal congestion     R09.81    Relevant Medications    fluticasone (Flonase) 50 mcg/actuation nasal spray

## 2024-12-20 NOTE — ASSESSMENT & PLAN NOTE
Patient at high risk for COVID due to her medical problem hypertension , obesity smoking , will treat with Paxlovid , advised to hold off rosuvastatin while taking Paxlovid .  rest,push fluid,monitor pulse ox closely,go to ER if below 90 % or if short of breath.  Follow CDC recommendation regarding guidelines isolation and recommendation.  Call if not better.  Add Flonase nasal spray for nasal congestion as needed.  Can Tylenol for pain and fever and Mucinex DM for cough as needed.

## 2025-01-11 DIAGNOSIS — R09.81 NASAL CONGESTION: ICD-10-CM

## 2025-01-13 RX ORDER — FLUTICASONE PROPIONATE 50 MCG
SPRAY, SUSPENSION (ML) NASAL
Qty: 48 ML | Refills: 3 | Status: SHIPPED | OUTPATIENT
Start: 2025-01-13

## 2025-02-11 DIAGNOSIS — E78.5 HYPERLIPIDEMIA, UNSPECIFIED: ICD-10-CM

## 2025-02-11 RX ORDER — ROSUVASTATIN CALCIUM 10 MG/1
10 TABLET, COATED ORAL DAILY
Qty: 90 TABLET | Refills: 0 | Status: SHIPPED | OUTPATIENT
Start: 2025-02-11

## 2025-02-24 ENCOUNTER — APPOINTMENT (OUTPATIENT)
Dept: PRIMARY CARE | Facility: CLINIC | Age: 50
End: 2025-02-24
Payer: COMMERCIAL

## 2025-05-16 DIAGNOSIS — E78.5 HYPERLIPIDEMIA, UNSPECIFIED: ICD-10-CM

## 2025-05-16 RX ORDER — ROSUVASTATIN CALCIUM 10 MG/1
10 TABLET, COATED ORAL DAILY
Qty: 90 TABLET | Refills: 0 | Status: SHIPPED | OUTPATIENT
Start: 2025-05-16

## 2025-05-16 NOTE — TELEPHONE ENCOUNTER
Thierry for pt to schedule a physical, advised it would be the end of June or early to mid July. Advise Tasia when appt scheduled so medication can be sent in

## 2025-06-29 ENCOUNTER — PATIENT MESSAGE (OUTPATIENT)
Dept: PRIMARY CARE | Facility: CLINIC | Age: 50
End: 2025-06-29
Payer: COMMERCIAL

## 2025-06-29 DIAGNOSIS — M25.539 PAIN IN WRIST, UNSPECIFIED LATERALITY: Primary | ICD-10-CM

## 2025-06-29 DIAGNOSIS — R20.2 HAND PARESTHESIA, UNSPECIFIED LATERALITY: ICD-10-CM

## 2025-07-07 ENCOUNTER — OFFICE VISIT (OUTPATIENT)
Dept: PRIMARY CARE | Facility: CLINIC | Age: 50
End: 2025-07-07
Payer: COMMERCIAL

## 2025-07-07 VITALS
BODY MASS INDEX: 44.37 KG/M2 | HEIGHT: 63 IN | TEMPERATURE: 98 F | WEIGHT: 250.4 LBS | RESPIRATION RATE: 16 BRPM | HEART RATE: 95 BPM | OXYGEN SATURATION: 98 % | DIASTOLIC BLOOD PRESSURE: 80 MMHG | SYSTOLIC BLOOD PRESSURE: 140 MMHG

## 2025-07-07 DIAGNOSIS — R05.1 ACUTE COUGH: ICD-10-CM

## 2025-07-07 DIAGNOSIS — R09.81 NASAL CONGESTION: ICD-10-CM

## 2025-07-07 PROCEDURE — 3079F DIAST BP 80-89 MM HG: CPT | Performed by: NURSE PRACTITIONER

## 2025-07-07 PROCEDURE — 3077F SYST BP >= 140 MM HG: CPT | Performed by: NURSE PRACTITIONER

## 2025-07-07 PROCEDURE — 3008F BODY MASS INDEX DOCD: CPT | Performed by: NURSE PRACTITIONER

## 2025-07-07 PROCEDURE — 99213 OFFICE O/P EST LOW 20 MIN: CPT | Performed by: NURSE PRACTITIONER

## 2025-07-07 RX ORDER — FLUTICASONE PROPIONATE 50 MCG
1 SPRAY, SUSPENSION (ML) NASAL DAILY
Qty: 48 ML | Refills: 0 | Status: SHIPPED | OUTPATIENT
Start: 2025-07-07

## 2025-07-07 RX ORDER — BENZONATATE 200 MG/1
200 CAPSULE ORAL 3 TIMES DAILY PRN
Qty: 42 CAPSULE | Refills: 0 | Status: SHIPPED | OUTPATIENT
Start: 2025-07-07 | End: 2025-07-21

## 2025-07-07 RX ORDER — ALBUTEROL SULFATE 90 UG/1
INHALANT RESPIRATORY (INHALATION)
Qty: 8.5 G | Refills: 0 | Status: SHIPPED | OUTPATIENT
Start: 2025-07-07

## 2025-07-07 ASSESSMENT — ENCOUNTER SYMPTOMS
VOMITING: 0
NAUSEA: 0
MYALGIAS: 0
FATIGUE: 0
CHEST TIGHTNESS: 0
NUMBNESS: 0
PALPITATIONS: 0
SINUS PRESSURE: 0
SORE THROAT: 0
COUGH: 1
CONSTIPATION: 0
FEVER: 0
SHORTNESS OF BREATH: 0
HEADACHES: 0
ARTHRALGIAS: 0
DIARRHEA: 1
CHILLS: 0
WHEEZING: 1
DIZZINESS: 0

## 2025-07-07 NOTE — LETTER
July 7, 2025     Patient: Oni Washburn   YOB: 1975   Date of Visit: 7/7/2025       To Whom It May Concern:    Oni Washburn was seen in my clinic on 7/7/2025 at 1:00 pm. Please excuse Oni for her absence from work on 7/7/25 - 7/8/25.     If you have any questions or concerns, please don't hesitate to call.         Sincerely,         Carey Silver, TEJAS-CNP        CC: No Recipients

## 2025-07-07 NOTE — ASSESSMENT & PLAN NOTE
Orders:    albuterol (ProAir HFA) 90 mcg/actuation inhaler; Inhale 2 puffs 4 times a day as needed for cough shortness of breath or wheezing.    benzonatate (Tessalon) 200 mg capsule; Take 1 capsule (200 mg) by mouth 3 times a day as needed for cough for up to 14 days. Do not crush or chew.

## 2025-07-07 NOTE — PROGRESS NOTES
"Subjective   Patient ID: Oni Washburn is a 49 y.o. female who presents for Cough (Pt is here due to a cough , for 4 days ,with nasal congestion  Dr. Rain pt ./Pt stated tested for covid negative this morning ).    Symptoms started Friday.   Congestion, dry cough. Small amount of diarrhea last night into today.   Nightquil with no relief.   COVID (-) this AM.     Cough  Associated symptoms include wheezing. Pertinent negatives include no chest pain, chills, ear pain, fever, headaches, myalgias, postnasal drip, rash, sore throat or shortness of breath.        Review of Systems   Constitutional:  Negative for chills, fatigue and fever.   HENT:  Positive for congestion. Negative for ear pain, postnasal drip, sinus pressure and sore throat.    Respiratory:  Positive for cough and wheezing. Negative for chest tightness and shortness of breath.    Cardiovascular:  Negative for chest pain, palpitations and leg swelling.   Gastrointestinal:  Positive for diarrhea. Negative for constipation, nausea and vomiting.   Musculoskeletal:  Negative for arthralgias and myalgias.   Skin:  Negative for rash.   Neurological:  Negative for dizziness, numbness and headaches.   All other systems reviewed and are negative.      Objective   /80 (BP Location: Right arm, Patient Position: Sitting, BP Cuff Size: Large adult)   Pulse 95   Temp 36.7 °C (98 °F) (Temporal)   Resp 16   Ht 1.6 m (5' 2.99\")   Wt 114 kg (250 lb 6.4 oz)   LMP 08/27/2024 (Exact Date)   SpO2 98%   BMI 44.37 kg/m²     Physical Exam  Vitals reviewed.   Constitutional:       Appearance: Normal appearance. She is obese.   HENT:      Right Ear: Tympanic membrane and ear canal normal.      Left Ear: Tympanic membrane and ear canal normal.      Nose: Congestion and rhinorrhea present.      Comments: (+) turbinates swollen B/L     Mouth/Throat:      Mouth: Mucous membranes are moist.      Pharynx: Oropharynx is clear. Posterior oropharyngeal erythema present. "   Eyes:      Conjunctiva/sclera: Conjunctivae normal.   Cardiovascular:      Rate and Rhythm: Normal rate and regular rhythm.      Pulses: Normal pulses.      Heart sounds: Normal heart sounds.   Pulmonary:      Effort: Pulmonary effort is normal. No respiratory distress.      Breath sounds: Normal breath sounds. No wheezing, rhonchi or rales.   Abdominal:      General: Abdomen is flat.      Palpations: Abdomen is soft.   Musculoskeletal:         General: Normal range of motion.   Skin:     General: Skin is warm and dry.   Neurological:      General: No focal deficit present.      Mental Status: She is alert and oriented to person, place, and time.         Assessment/Plan   Assessment & Plan  Acute cough    Orders:    albuterol (ProAir HFA) 90 mcg/actuation inhaler; Inhale 2 puffs 4 times a day as needed for cough shortness of breath or wheezing.    benzonatate (Tessalon) 200 mg capsule; Take 1 capsule (200 mg) by mouth 3 times a day as needed for cough for up to 14 days. Do not crush or chew.    Nasal congestion    Orders:    fluticasone (Flonase) 50 mcg/actuation nasal spray; Administer 1 spray into each nostril once daily. Shake gently. Before first use, prime pump. After use, clean tip and replace cap.    -Symptoms likely viral.  -Negative COVID per patient.   -Take medications as prescribed. Albuterol as needed for wheezing.   -Encouraged saline nasal rinses.   -Increase fluids.   -Call or return to office as needed if these symptoms worsen or fail to improve as anticipated.  -Follow up with Dr. Rain as scheduled.

## 2025-07-16 ENCOUNTER — APPOINTMENT (OUTPATIENT)
Dept: ORTHOPEDIC SURGERY | Facility: CLINIC | Age: 50
End: 2025-07-16
Payer: COMMERCIAL

## 2025-07-16 DIAGNOSIS — G56.03 BILATERAL CARPAL TUNNEL SYNDROME: Primary | ICD-10-CM

## 2025-07-16 PROCEDURE — 99203 OFFICE O/P NEW LOW 30 MIN: CPT | Performed by: ORTHOPAEDIC SURGERY

## 2025-07-16 ASSESSMENT — PAIN - FUNCTIONAL ASSESSMENT: PAIN_FUNCTIONAL_ASSESSMENT: NO/DENIES PAIN

## 2025-07-16 NOTE — LETTER
August 2, 2025     Edie Rain MD  [address]  [facility], [address]  [facility] [place] [address]    Patient: Oni Washburn   YOB: 1975   Date of Visit: 7/16/2025       Dear Dr. Edie Rain MD:    Thank you for referring Oni Washburn to me for evaluation. Below are my notes for this consultation.  If you have questions, please do not hesitate to call me. I look forward to following your patient along with you.       Sincerely,     Keny Michaels MD      CC: No Recipients  ______________________________________________________________________________________    CHIEF COMPLAINT         Bilateral hand tingling and numbness    ASSESSMENT + PLAN    Bilateral carpal tunnel syndrome, worse on the right    The nature of carpal tunnel syndrome was reviewed, along with the slowly progressive natural history.  The options for management were reviewed, including night splinting, cortisone injection, or surgical carpal tunnel release.  The major benefits and risks of surgery were specifically reviewed, as was the postoperative rehabilitation course.    Before trying anything invasive, the patient wanted to try a course of night splinting.  Proper splint use was reviewed.  Followup in 4 weeks if things are not improving to their satisfaction.        HISTORY OF PRESENT ILLNESS       Patient is a 49 y.o. right-hand dominant female , who presents today in consultation from Dr. Rain for evaluation of numbness and tingling in the the fingers of both hands.  This began last year without a single specific recalled trauma.  It is worse on days of heavier hand use, especially typing.  It does wake her at night with a positive shake sign, more often on the right.  The daytime symptoms tend to be more bothersome than the night.  It does not involve the dorsum of the hand or the small fingers.  No noted weakness.  Not dropping objects.      REVIEW OF SYSTEMS       A 30-item multi-system  Review Of Systems was obtained on today's intake form.  This was reviewed with the patient and is correct.  The pertinent positives and negatives are listed above.  The form has been scanned separately into the medical record.      PHYSICAL EXAM    Constitutional:    Appears stated age. Well-developed and well-nourished morbidly obese female in no acute distress.  Psychiatric:         Pleasant normal mood and affect. Behavior is appropriate for the situation.   Head:                   Normocephalic and atraumatic.  Eyes:                    Pupils are equal and round.  Cardiovascular:  2+ radial and ulnar pulses. Fingers well-perfused.  Respiratory:        Effort normal. No respiratory distress. Speaking in complete sentences.  Neurologic:       Alert and oriented to person, place, and time.  Skin:                Skin is intact, warm and dry.  Hematologic / Lymphatic:    No lymphedema or lymphangitis.    Extremities / Musculoskeletal:                      Skin of both hands and wrists is intact with no erythema, ecchymosis, or diffuse swelling.  Normal skin drag and coloration.  Full composite finger flexion extension with good intrinsic plus minus posture.  5/5 APB and hand intrinsics with no wasting.  Positive Phalen and Durkan bilaterally, more brisk on the right.  Negative Tinel at wrist and elbow.  Negative elbow flexion test.  Cervical range of motion is good and does not reproduce discomfort in either hand.  No pathologic ulnar motor signs.  Negative Anderson.  Sensation intact to light touch in all distributions.  Capillary refill less than 2 seconds.      IMAGING / LABS / EMGs           None pertinent      Medical History[1]    Medication Documentation Review Audit       Reviewed by Keny Michaels MD (Physician) on 08/02/25 at 1340      Medication Order Taking? Sig Documenting Provider Last Dose Status   albuterol (ProAir HFA) 90 mcg/actuation inhaler [id number]  Inhale 2 puffs 4 times a day as needed for  cough shortness of breath or wheezing. BARBARA Putnam  Active   amLODIPine (Norvasc) 10 mg tablet [id number]  TAKE 1 TABLET BY MOUTH EVERY DAY Edie Rain MD  Active   fluticasone (Flonase) 50 mcg/actuation nasal spray [id number]  Administer 1 spray into each nostril once daily. Shake gently. Before first use, prime pump. After use, clean tip and replace cap. BARBARA Putnam  Active   labetalol (Normodyne) 200 mg tablet [id number]  TAKE 1 TABLET BY MOUTH TWICE A DAY Edie Rain MD  Active   meclizine (Antivert) 25 mg tablet [id number]  Take 1 tablet (25 mg) by mouth 3 times a day as needed for dizziness or nausea. Edie Rain MD  Active   prochlorperazine (Compazine) 10 mg tablet [id number]  TAKE 1 TABLET EVERY 4 TO 6 HOURS AS NEEDED FOR NAUSEA. Edie Rain MD  Active   rosuvastatin (Crestor) 10 mg tablet [id number]  TAKE 1 TABLET BY MOUTH EVERY DAY Edie Rain MD  Active   SUMAtriptan (Imitrex) 100 mg tablet [id number] No Take 1 tablet (100 mg) by mouth 1 time if needed for migraine. TAKE 1 TABLET BY MOUTH ONCE AFTER ONSET OF MIGRAINE ATTACK-MAY REPEATAFTER 2 HOURS Historical Provider, MD Taking Active                    RX Allergies[2]    Social History     Socioeconomic History   • Marital status: Single     Spouse name: Not on file   • Number of children: Not on file   • Years of education: Not on file   • Highest education level: Not on file   Occupational History   • Not on file   Tobacco Use   • Smoking status: Every Day     Current packs/day: 1.00     Types: Cigarettes   • Smokeless tobacco: Never   • Tobacco comments:     About 1 pack every 1-2 days    Vaping Use   • Vaping status: Never Used   Substance and Sexual Activity   • Alcohol use: Yes     Comment: Social   • Drug use: Never   • Sexual activity: Not on file   Other Topics Concern   • Not on file   Social History Narrative   • Not on file     Social Drivers of Health     Financial Resource Strain: Not  on file   Food Insecurity: Not on file   Transportation Needs: Not on file   Physical Activity: Not on file   Stress: Not on file   Social Connections: Not on file   Intimate Partner Violence: Not on file   Housing Stability: Not on file       Surgical History[3]      Electronically Signed      VITA Michaels MD      Orthopaedic Hand Surgery      [phone]       [1]  History reviewed. No pertinent past medical history.  [2]  No Known Allergies  [3]  Past Surgical History:  Procedure Laterality Date   • CERVICAL BIOPSY  W/ LOOP ELECTRODE EXCISION  03/21/2017    Cervical Loop Electrosurgical Excision (LEEP)   • TONSILLECTOMY  03/21/2017    Tonsillectomy

## 2025-08-02 NOTE — PROGRESS NOTES
CHIEF COMPLAINT         Bilateral hand tingling and numbness    ASSESSMENT + PLAN    Bilateral carpal tunnel syndrome, worse on the right    The nature of carpal tunnel syndrome was reviewed, along with the slowly progressive natural history.  The options for management were reviewed, including night splinting, cortisone injection, or surgical carpal tunnel release.  The major benefits and risks of surgery were specifically reviewed, as was the postoperative rehabilitation course.    Before trying anything invasive, the patient wanted to try a course of night splinting.  Proper splint use was reviewed.  Followup in 4 weeks if things are not improving to their satisfaction.        HISTORY OF PRESENT ILLNESS       Patient is a 49 y.o. right-hand dominant female , who presents today in consultation from Dr. Rain for evaluation of numbness and tingling in the the fingers of both hands.  This began last year without a single specific recalled trauma.  It is worse on days of heavier hand use, especially typing.  It does wake her at night with a positive shake sign, more often on the right.  The daytime symptoms tend to be more bothersome than the night.  It does not involve the dorsum of the hand or the small fingers.  No noted weakness.  Not dropping objects.      REVIEW OF SYSTEMS       A 30-item multi-system Review Of Systems was obtained on today's intake form.  This was reviewed with the patient and is correct.  The pertinent positives and negatives are listed above.  The form has been scanned separately into the medical record.      PHYSICAL EXAM    Constitutional:    Appears stated age. Well-developed and well-nourished morbidly obese female in no acute distress.  Psychiatric:         Pleasant normal mood and affect. Behavior is appropriate for the situation.   Head:                   Normocephalic and atraumatic.  Eyes:                    Pupils are equal and round.  Cardiovascular:  2+ radial  and ulnar pulses. Fingers well-perfused.  Respiratory:        Effort normal. No respiratory distress. Speaking in complete sentences.  Neurologic:       Alert and oriented to person, place, and time.  Skin:                Skin is intact, warm and dry.  Hematologic / Lymphatic:    No lymphedema or lymphangitis.    Extremities / Musculoskeletal:                      Skin of both hands and wrists is intact with no erythema, ecchymosis, or diffuse swelling.  Normal skin drag and coloration.  Full composite finger flexion extension with good intrinsic plus minus posture.  5/5 APB and hand intrinsics with no wasting.  Positive Phalen and Durkan bilaterally, more brisk on the right.  Negative Tinel at wrist and elbow.  Negative elbow flexion test.  Cervical range of motion is good and does not reproduce discomfort in either hand.  No pathologic ulnar motor signs.  Negative Anderson.  Sensation intact to light touch in all distributions.  Capillary refill less than 2 seconds.      IMAGING / LABS / EMGs           None pertinent      Medical History[1]    Medication Documentation Review Audit       Reviewed by Keny Michaels MD (Physician) on 08/02/25 at 1340      Medication Order Taking? Sig Documenting Provider Last Dose Status   albuterol (ProAir HFA) 90 mcg/actuation inhaler [id number]  Inhale 2 puffs 4 times a day as needed for cough shortness of breath or wheezing. BARBARA Putnam  Active   amLODIPine (Norvasc) 10 mg tablet [id number]  TAKE 1 TABLET BY MOUTH EVERY DAY Edie Rain MD  Active   fluticasone (Flonase) 50 mcg/actuation nasal spray [id number]  Administer 1 spray into each nostril once daily. Shake gently. Before first use, prime pump. After use, clean tip and replace cap. BARBARA Putnam  Active   labetalol (Normodyne) 200 mg tablet [id number]  TAKE 1 TABLET BY MOUTH TWICE A DAY Edie Rain MD  Active   meclizine (Antivert) 25 mg tablet [id number]  Take 1 tablet (25 mg) by  mouth 3 times a day as needed for dizziness or nausea. Edie Rain MD  Active   prochlorperazine (Compazine) 10 mg tablet [id number]  TAKE 1 TABLET EVERY 4 TO 6 HOURS AS NEEDED FOR NAUSEA. Edie Rain MD  Active   rosuvastatin (Crestor) 10 mg tablet [id number]  TAKE 1 TABLET BY MOUTH EVERY DAY Edie Rain MD  Active   SUMAtriptan (Imitrex) 100 mg tablet [id number] No Take 1 tablet (100 mg) by mouth 1 time if needed for migraine. TAKE 1 TABLET BY MOUTH ONCE AFTER ONSET OF MIGRAINE ATTACK-MAY REPEATAFTER 2 HOURS Historical Provider, MD Taking Active                    RX Allergies[2]    Social History     Socioeconomic History    Marital status: Single     Spouse name: Not on file    Number of children: Not on file    Years of education: Not on file    Highest education level: Not on file   Occupational History    Not on file   Tobacco Use    Smoking status: Every Day     Current packs/day: 1.00     Types: Cigarettes    Smokeless tobacco: Never    Tobacco comments:     About 1 pack every 1-2 days    Vaping Use    Vaping status: Never Used   Substance and Sexual Activity    Alcohol use: Yes     Comment: Social    Drug use: Never    Sexual activity: Not on file   Other Topics Concern    Not on file   Social History Narrative    Not on file     Social Drivers of Health     Financial Resource Strain: Not on file   Food Insecurity: Not on file   Transportation Needs: Not on file   Physical Activity: Not on file   Stress: Not on file   Social Connections: Not on file   Intimate Partner Violence: Not on file   Housing Stability: Not on file       Surgical History[3]      Electronically Signed      VITA Michaels MD      Orthopaedic Hand Surgery      [phone]       [1] History reviewed. No pertinent past medical history.  [2] No Known Allergies  [3]   Past Surgical History:  Procedure Laterality Date    CERVICAL BIOPSY  W/ LOOP ELECTRODE EXCISION  03/21/2017    Cervical Loop Electrosurgical Excision  (STEPHON)    TONSILLECTOMY  03/21/2017    Tonsillectomy

## 2025-08-21 DIAGNOSIS — E78.5 HYPERLIPIDEMIA, UNSPECIFIED: ICD-10-CM

## 2025-08-21 RX ORDER — ROSUVASTATIN CALCIUM 10 MG/1
10 TABLET, COATED ORAL DAILY
Qty: 90 TABLET | Refills: 0 | Status: SHIPPED | OUTPATIENT
Start: 2025-08-21

## 2025-09-08 ENCOUNTER — APPOINTMENT (OUTPATIENT)
Dept: PRIMARY CARE | Facility: CLINIC | Age: 50
End: 2025-09-08
Payer: COMMERCIAL